# Patient Record
Sex: MALE | Race: WHITE | NOT HISPANIC OR LATINO | URBAN - METROPOLITAN AREA
[De-identification: names, ages, dates, MRNs, and addresses within clinical notes are randomized per-mention and may not be internally consistent; named-entity substitution may affect disease eponyms.]

---

## 2021-06-24 ENCOUNTER — INPATIENT (INPATIENT)
Facility: HOSPITAL | Age: 56
LOS: 0 days | Discharge: HOME | DRG: 419 | End: 2021-06-25
Attending: SURGERY | Admitting: SURGERY
Payer: COMMERCIAL

## 2021-06-24 VITALS
SYSTOLIC BLOOD PRESSURE: 172 MMHG | TEMPERATURE: 98 F | DIASTOLIC BLOOD PRESSURE: 74 MMHG | RESPIRATION RATE: 19 BRPM | HEIGHT: 71 IN | HEART RATE: 55 BPM | WEIGHT: 173.06 LBS | OXYGEN SATURATION: 100 %

## 2021-06-24 DIAGNOSIS — K81.0 ACUTE CHOLECYSTITIS: ICD-10-CM

## 2021-06-24 LAB
ALBUMIN SERPL ELPH-MCNC: 4 G/DL — SIGNIFICANT CHANGE UP (ref 3.5–5)
ALP SERPL-CCNC: 74 U/L — SIGNIFICANT CHANGE UP (ref 40–120)
ALT FLD-CCNC: 39 U/L DA — SIGNIFICANT CHANGE UP (ref 10–60)
ANION GAP SERPL CALC-SCNC: 8 MMOL/L — SIGNIFICANT CHANGE UP (ref 5–17)
APTT BLD: 29.3 SEC — SIGNIFICANT CHANGE UP (ref 27.5–35.5)
AST SERPL-CCNC: 34 U/L — SIGNIFICANT CHANGE UP (ref 10–40)
BASOPHILS # BLD AUTO: 0.05 K/UL — SIGNIFICANT CHANGE UP (ref 0–0.2)
BASOPHILS NFR BLD AUTO: 0.5 % — SIGNIFICANT CHANGE UP (ref 0–2)
BILIRUB SERPL-MCNC: 0.9 MG/DL — SIGNIFICANT CHANGE UP (ref 0.2–1.2)
BLD GP AB SCN SERPL QL: SIGNIFICANT CHANGE UP
BUN SERPL-MCNC: 13 MG/DL — SIGNIFICANT CHANGE UP (ref 7–18)
CALCIUM SERPL-MCNC: 8.7 MG/DL — SIGNIFICANT CHANGE UP (ref 8.4–10.5)
CHLORIDE SERPL-SCNC: 104 MMOL/L — SIGNIFICANT CHANGE UP (ref 96–108)
CO2 SERPL-SCNC: 30 MMOL/L — SIGNIFICANT CHANGE UP (ref 22–31)
CREAT SERPL-MCNC: 0.58 MG/DL — SIGNIFICANT CHANGE UP (ref 0.5–1.3)
EOSINOPHIL # BLD AUTO: 0.04 K/UL — SIGNIFICANT CHANGE UP (ref 0–0.5)
EOSINOPHIL NFR BLD AUTO: 0.4 % — SIGNIFICANT CHANGE UP (ref 0–6)
GLUCOSE SERPL-MCNC: 138 MG/DL — HIGH (ref 70–99)
HCT VFR BLD CALC: 38.1 % — LOW (ref 39–50)
HGB BLD-MCNC: 13.4 G/DL — SIGNIFICANT CHANGE UP (ref 13–17)
IMM GRANULOCYTES NFR BLD AUTO: 0.4 % — SIGNIFICANT CHANGE UP (ref 0–1.5)
INR BLD: 1.2 RATIO — HIGH (ref 0.88–1.16)
LACTATE SERPL-SCNC: 1.4 MMOL/L — SIGNIFICANT CHANGE UP (ref 0.7–2)
LIDOCAIN IGE QN: 64 U/L — LOW (ref 73–393)
LYMPHOCYTES # BLD AUTO: 0.75 K/UL — LOW (ref 1–3.3)
LYMPHOCYTES # BLD AUTO: 7.8 % — LOW (ref 13–44)
MCHC RBC-ENTMCNC: 29.6 PG — SIGNIFICANT CHANGE UP (ref 27–34)
MCHC RBC-ENTMCNC: 35.2 GM/DL — SIGNIFICANT CHANGE UP (ref 32–36)
MCV RBC AUTO: 84.3 FL — SIGNIFICANT CHANGE UP (ref 80–100)
MONOCYTES # BLD AUTO: 0.42 K/UL — SIGNIFICANT CHANGE UP (ref 0–0.9)
MONOCYTES NFR BLD AUTO: 4.4 % — SIGNIFICANT CHANGE UP (ref 2–14)
NEUTROPHILS # BLD AUTO: 8.34 K/UL — HIGH (ref 1.8–7.4)
NEUTROPHILS NFR BLD AUTO: 86.5 % — HIGH (ref 43–77)
NRBC # BLD: 0 /100 WBCS — SIGNIFICANT CHANGE UP (ref 0–0)
NT-PROBNP SERPL-SCNC: 248 PG/ML — HIGH (ref 0–125)
PLATELET # BLD AUTO: 173 K/UL — SIGNIFICANT CHANGE UP (ref 150–400)
POTASSIUM SERPL-MCNC: 3.2 MMOL/L — LOW (ref 3.5–5.3)
POTASSIUM SERPL-SCNC: 3.2 MMOL/L — LOW (ref 3.5–5.3)
PROT SERPL-MCNC: 6.7 G/DL — SIGNIFICANT CHANGE UP (ref 6–8.3)
PROTHROM AB SERPL-ACNC: 14.2 SEC — HIGH (ref 10.6–13.6)
RBC # BLD: 4.52 M/UL — SIGNIFICANT CHANGE UP (ref 4.2–5.8)
RBC # FLD: 12.7 % — SIGNIFICANT CHANGE UP (ref 10.3–14.5)
SARS-COV-2 RNA SPEC QL NAA+PROBE: SIGNIFICANT CHANGE UP
SODIUM SERPL-SCNC: 142 MMOL/L — SIGNIFICANT CHANGE UP (ref 135–145)
TROPONIN I SERPL-MCNC: <0.015 NG/ML — SIGNIFICANT CHANGE UP (ref 0–0.04)
WBC # BLD: 9.64 K/UL — SIGNIFICANT CHANGE UP (ref 3.8–10.5)
WBC # FLD AUTO: 9.64 K/UL — SIGNIFICANT CHANGE UP (ref 3.8–10.5)

## 2021-06-24 PROCEDURE — 88304 TISSUE EXAM BY PATHOLOGIST: CPT | Mod: 26

## 2021-06-24 PROCEDURE — 76705 ECHO EXAM OF ABDOMEN: CPT | Mod: 26

## 2021-06-24 PROCEDURE — 99285 EMERGENCY DEPT VISIT HI MDM: CPT

## 2021-06-24 PROCEDURE — 93010 ELECTROCARDIOGRAM REPORT: CPT

## 2021-06-24 PROCEDURE — 99223 1ST HOSP IP/OBS HIGH 75: CPT | Mod: 57

## 2021-06-24 PROCEDURE — 47563 LAPARO CHOLECYSTECTOMY/GRAPH: CPT | Mod: AS

## 2021-06-24 PROCEDURE — 47563 LAPARO CHOLECYSTECTOMY/GRAPH: CPT

## 2021-06-24 PROCEDURE — 71046 X-RAY EXAM CHEST 2 VIEWS: CPT | Mod: 26

## 2021-06-24 PROCEDURE — 99222 1ST HOSP IP/OBS MODERATE 55: CPT | Mod: GC

## 2021-06-24 RX ORDER — DEXTROSE MONOHYDRATE, SODIUM CHLORIDE, AND POTASSIUM CHLORIDE 50; .745; 4.5 G/1000ML; G/1000ML; G/1000ML
1000 INJECTION, SOLUTION INTRAVENOUS
Refills: 0 | Status: DISCONTINUED | OUTPATIENT
Start: 2021-06-24 | End: 2021-06-24

## 2021-06-24 RX ORDER — POTASSIUM CHLORIDE 20 MEQ
10 PACKET (EA) ORAL
Refills: 0 | Status: DISCONTINUED | OUTPATIENT
Start: 2021-06-24 | End: 2021-06-24

## 2021-06-24 RX ORDER — AZITHROMYCIN 500 MG/1
250 TABLET, FILM COATED ORAL
Refills: 0 | Status: DISCONTINUED | OUTPATIENT
Start: 2021-06-24 | End: 2021-06-24

## 2021-06-24 RX ORDER — ATORVASTATIN CALCIUM 80 MG/1
10 TABLET, FILM COATED ORAL AT BEDTIME
Refills: 0 | Status: DISCONTINUED | OUTPATIENT
Start: 2021-06-24 | End: 2021-06-25

## 2021-06-24 RX ORDER — ONDANSETRON 8 MG/1
4 TABLET, FILM COATED ORAL EVERY 6 HOURS
Refills: 0 | Status: DISCONTINUED | OUTPATIENT
Start: 2021-06-24 | End: 2021-06-24

## 2021-06-24 RX ORDER — AZITHROMYCIN 500 MG/1
250 TABLET, FILM COATED ORAL
Refills: 0 | Status: DISCONTINUED | OUTPATIENT
Start: 2021-06-24 | End: 2021-06-25

## 2021-06-24 RX ORDER — AZITHROMYCIN 500 MG/1
1 TABLET, FILM COATED ORAL
Qty: 0 | Refills: 0 | DISCHARGE

## 2021-06-24 RX ORDER — SODIUM CHLORIDE 9 MG/ML
1000 INJECTION INTRAMUSCULAR; INTRAVENOUS; SUBCUTANEOUS ONCE
Refills: 0 | Status: COMPLETED | OUTPATIENT
Start: 2021-06-24 | End: 2021-06-24

## 2021-06-24 RX ORDER — HYDROXYCHLOROQUINE SULFATE 200 MG
200 TABLET ORAL DAILY
Refills: 0 | Status: DISCONTINUED | OUTPATIENT
Start: 2021-06-24 | End: 2021-06-25

## 2021-06-24 RX ORDER — MORPHINE SULFATE 50 MG/1
4 CAPSULE, EXTENDED RELEASE ORAL ONCE
Refills: 0 | Status: DISCONTINUED | OUTPATIENT
Start: 2021-06-24 | End: 2021-06-24

## 2021-06-24 RX ORDER — ATORVASTATIN CALCIUM 80 MG/1
10 TABLET, FILM COATED ORAL AT BEDTIME
Refills: 0 | Status: DISCONTINUED | OUTPATIENT
Start: 2021-06-24 | End: 2021-06-24

## 2021-06-24 RX ORDER — HYDROMORPHONE HYDROCHLORIDE 2 MG/ML
0.5 INJECTION INTRAMUSCULAR; INTRAVENOUS; SUBCUTANEOUS EVERY 4 HOURS
Refills: 0 | Status: DISCONTINUED | OUTPATIENT
Start: 2021-06-24 | End: 2021-06-25

## 2021-06-24 RX ORDER — HYDROMORPHONE HYDROCHLORIDE 2 MG/ML
0.5 INJECTION INTRAMUSCULAR; INTRAVENOUS; SUBCUTANEOUS
Refills: 0 | Status: DISCONTINUED | OUTPATIENT
Start: 2021-06-24 | End: 2021-06-24

## 2021-06-24 RX ORDER — SODIUM CHLORIDE 9 MG/ML
1000 INJECTION, SOLUTION INTRAVENOUS
Refills: 0 | Status: DISCONTINUED | OUTPATIENT
Start: 2021-06-24 | End: 2021-06-24

## 2021-06-24 RX ORDER — ONDANSETRON 8 MG/1
4 TABLET, FILM COATED ORAL ONCE
Refills: 0 | Status: COMPLETED | OUTPATIENT
Start: 2021-06-24 | End: 2021-06-24

## 2021-06-24 RX ORDER — ACETAMINOPHEN 500 MG
650 TABLET ORAL EVERY 6 HOURS
Refills: 0 | Status: DISCONTINUED | OUTPATIENT
Start: 2021-06-24 | End: 2021-06-24

## 2021-06-24 RX ORDER — ATORVASTATIN CALCIUM 80 MG/1
1 TABLET, FILM COATED ORAL
Qty: 0 | Refills: 0 | DISCHARGE

## 2021-06-24 RX ORDER — PANTOPRAZOLE SODIUM 20 MG/1
40 TABLET, DELAYED RELEASE ORAL
Refills: 0 | Status: DISCONTINUED | OUTPATIENT
Start: 2021-06-24 | End: 2021-06-25

## 2021-06-24 RX ORDER — ONDANSETRON 8 MG/1
4 TABLET, FILM COATED ORAL EVERY 6 HOURS
Refills: 0 | Status: DISCONTINUED | OUTPATIENT
Start: 2021-06-24 | End: 2021-06-25

## 2021-06-24 RX ORDER — BUDESONIDE AND FORMOTEROL FUMARATE DIHYDRATE 160; 4.5 UG/1; UG/1
2 AEROSOL RESPIRATORY (INHALATION)
Refills: 0 | Status: DISCONTINUED | OUTPATIENT
Start: 2021-06-24 | End: 2021-06-25

## 2021-06-24 RX ORDER — PANTOPRAZOLE SODIUM 20 MG/1
40 TABLET, DELAYED RELEASE ORAL
Refills: 0 | Status: DISCONTINUED | OUTPATIENT
Start: 2021-06-24 | End: 2021-06-24

## 2021-06-24 RX ORDER — ONDANSETRON 8 MG/1
4 TABLET, FILM COATED ORAL ONCE
Refills: 0 | Status: DISCONTINUED | OUTPATIENT
Start: 2021-06-24 | End: 2021-06-24

## 2021-06-24 RX ORDER — ACETAMINOPHEN 500 MG
1000 TABLET ORAL ONCE
Refills: 0 | Status: DISCONTINUED | OUTPATIENT
Start: 2021-06-24 | End: 2021-06-24

## 2021-06-24 RX ORDER — HYDROXYCHLOROQUINE SULFATE 200 MG
200 TABLET ORAL DAILY
Refills: 0 | Status: DISCONTINUED | OUTPATIENT
Start: 2021-06-24 | End: 2021-06-24

## 2021-06-24 RX ORDER — HYDROXYCHLOROQUINE SULFATE 200 MG
2 TABLET ORAL
Qty: 0 | Refills: 0 | DISCHARGE

## 2021-06-24 RX ORDER — HEPARIN SODIUM 5000 [USP'U]/ML
5000 INJECTION INTRAVENOUS; SUBCUTANEOUS EVERY 8 HOURS
Refills: 0 | Status: DISCONTINUED | OUTPATIENT
Start: 2021-06-24 | End: 2021-06-24

## 2021-06-24 RX ORDER — PANTOPRAZOLE SODIUM 20 MG/1
1 TABLET, DELAYED RELEASE ORAL
Qty: 0 | Refills: 0 | DISCHARGE

## 2021-06-24 RX ORDER — ACETAMINOPHEN 500 MG
650 TABLET ORAL EVERY 6 HOURS
Refills: 0 | Status: DISCONTINUED | OUTPATIENT
Start: 2021-06-24 | End: 2021-06-25

## 2021-06-24 RX ORDER — BUDESONIDE AND FORMOTEROL FUMARATE DIHYDRATE 160; 4.5 UG/1; UG/1
2 AEROSOL RESPIRATORY (INHALATION)
Refills: 0 | Status: DISCONTINUED | OUTPATIENT
Start: 2021-06-24 | End: 2021-06-24

## 2021-06-24 RX ORDER — FLUTICASONE PROPIONATE AND SALMETEROL 50; 250 UG/1; UG/1
1 POWDER ORAL; RESPIRATORY (INHALATION)
Qty: 0 | Refills: 0 | DISCHARGE

## 2021-06-24 RX ADMIN — MORPHINE SULFATE 4 MILLIGRAM(S): 50 CAPSULE, EXTENDED RELEASE ORAL at 12:15

## 2021-06-24 RX ADMIN — BUDESONIDE AND FORMOTEROL FUMARATE DIHYDRATE 2 PUFF(S): 160; 4.5 AEROSOL RESPIRATORY (INHALATION) at 22:06

## 2021-06-24 RX ADMIN — ATORVASTATIN CALCIUM 10 MILLIGRAM(S): 80 TABLET, FILM COATED ORAL at 22:33

## 2021-06-24 RX ADMIN — ONDANSETRON 4 MILLIGRAM(S): 8 TABLET, FILM COATED ORAL at 12:15

## 2021-06-24 RX ADMIN — MORPHINE SULFATE 4 MILLIGRAM(S): 50 CAPSULE, EXTENDED RELEASE ORAL at 13:36

## 2021-06-24 RX ADMIN — MORPHINE SULFATE 4 MILLIGRAM(S): 50 CAPSULE, EXTENDED RELEASE ORAL at 12:30

## 2021-06-24 RX ADMIN — SODIUM CHLORIDE 1000 MILLILITER(S): 9 INJECTION INTRAMUSCULAR; INTRAVENOUS; SUBCUTANEOUS at 11:35

## 2021-06-24 RX ADMIN — Medication 200 MILLIGRAM(S): at 22:08

## 2021-06-24 RX ADMIN — MORPHINE SULFATE 4 MILLIGRAM(S): 50 CAPSULE, EXTENDED RELEASE ORAL at 14:20

## 2021-06-24 NOTE — ED ADULT TRIAGE NOTE - CHIEF COMPLAINT QUOTE
since 430 c/o right quadrant pain radiating to chest was diagnosed with gallstones was to have surgery did not do to pandemic

## 2021-06-24 NOTE — H&P ADULT - ASSESSMENT
56 y/o Male w/ Acute cholecystitis; PMHx of Anemia, Lupus, lung fibrosis, Asthma, HLD, COVID    -Admit pt to Surgical services under care of Dr Gomez   -Plan for OR/ cholecystectomy today   -Medical clearance for OR   -ECG  -CXR   -Pre op labs   -NPO   -IVF   -Pain medication PRN   -DVT ppx   -D/w Dr Gomez and agrees

## 2021-06-24 NOTE — ED PROVIDER NOTE - CLINICAL SUMMARY MEDICAL DECISION MAKING FREE TEXT BOX
pt with known hs of gall stones now with severe ruq pain   most likely acute choli.  will get us, labs and give pain meds and antiemtics  most likely admit to surg

## 2021-06-24 NOTE — H&P ADULT - NSICDXPASTMEDICALHX_GEN_ALL_CORE_FT
PAST MEDICAL HISTORY:  Asthma     Asthma     Hepatitis likely hep A    HLD (hyperlipidemia)     Lung fibrosis     Lupus

## 2021-06-24 NOTE — ED ADULT NURSE NOTE - OBJECTIVE STATEMENT
Pt. c/o abdominal pain that started around 4 am this morning with nausea and radiating to the chest. Pt. has hx of gallstones but did not receive surgery because of pandemic.

## 2021-06-24 NOTE — CONSULT NOTE ADULT - SUBJECTIVE AND OBJECTIVE BOX
KARLEY KAHN  55y  Male      Patient is a 55y old  Male who presents with a chief complaint of Acute Cholelithiasis (24 Jun 2021 14:20)    HPI : 55 y M from home with h/o Lupus( diagnosed 7 yrs ago, currently in remission, on Plaquenil), Lung fibrosis( due to lupus, on prophylactic Azithromycin), Asthma, HLD ,Anemia and COVID( 3/2020, didnt require O2) and no signficant PSHx presented to ED with sudden sharp right sided abdominal pain since 4 am. Pain was constant , radiating to left side of abd and chest associated with nausea but no fever, vomiting or diarrhea. Pt had similar pain 2 yrs when he was diagnosed with gall stones but didnt underwent cholecystectomy since then due to pandemic. Pt states his lupus is in remission, no acute attacks recently. Pt is able to climb flight of stairs without getting SOB and can walk few blocks without any SOB. 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  Pt currently denies any CP, N/V or any other complaints       PAST MEDICAL/SURGICAL HISTORY  PAST MEDICAL & SURGICAL HISTORY:  Asthma    Hepatitis  likely hep A    Lupus    Lung fibrosis    Asthma    HLD (hyperlipidemia)    No significant past surgical history        REVIEW OF SYSTEMS:  CONSTITUTIONAL: No fever, weight loss, or fatigue  EYES: No eye pain, visual disturbances, or discharge  ENMT:  No difficulty hearing, tinnitus, vertigo; No sinus or throat pain  NECK: No pain or stiffness  RESPIRATORY: No cough, wheezing, chills or hemoptysis; No shortness of breath  CARDIOVASCULAR: No chest pain, palpitations, dizziness, or leg swelling  GASTROINTESTINAL: right sided abd pain . No nausea, vomiting, or hematemesis; No diarrhea or constipation. No melena or hematochezia.  GENITOURINARY: No dysuria, frequency, hematuria, or incontinence  NEUROLOGICAL: No headaches, memory loss, loss of strength, numbness, or tremors  SKIN: No itching, burning, rashes, or lesions   LYMPH NODES: No enlarged glands  ENDOCRINE: No heat or cold intolerance; No hair loss  MUSCULOSKELETAL: No joint pain or swelling; No muscle, back, or extremity pain  PSYCHIATRIC: No depression, anxiety, mood swings, or difficulty sleeping  HEME/LYMPH: No easy bruising, or bleeding gums  ALLERY AND IMMUNOLOGIC: No hives or eczema    T(C): 37 (06-24-21 @ 15:37), Max: 37 (06-24-21 @ 15:37)  HR: 98 (06-24-21 @ 15:37) (55 - 98)  BP: 132/60 (06-24-21 @ 15:37) (132/60 - 172/74)  RR: 18 (06-24-21 @ 15:37) (18 - 19)  SpO2: 100% (06-24-21 @ 15:37) (100% - 100%)  Wt(kg): --Vital Signs Last 24 Hrs  T(C): 37 (24 Jun 2021 15:37), Max: 37 (24 Jun 2021 15:37)  T(F): 98.6 (24 Jun 2021 15:37), Max: 98.6 (24 Jun 2021 15:37)  HR: 98 (24 Jun 2021 15:37) (55 - 98)  BP: 132/60 (24 Jun 2021 15:37) (132/60 - 172/74)  BP(mean): --  RR: 18 (24 Jun 2021 15:37) (18 - 19)  SpO2: 100% (24 Jun 2021 15:37) (100% - 100%)    PHYSICAL EXAM:  GENERAL: NAD, well-groomed, well-developed  HEAD:  Atraumatic, Normocephalic  EYES: EOMI, PERRLA, conjunctiva and sclera clear  ENMT: No tonsillar erythema, exudates, or enlargement; Moist mucous membranes, Good dentition, No lesions  NECK: Supple, No JVD, Normal thyroid  NERVOUS SYSTEM:  Alert & Oriented X3, Good concentration; Motor Strength 5/5 B/L upper and lower extremities; DTRs 2+ intact and symmetric  CHEST/LUNG: b/l diffuse fine crackles . No wheezing   HEART: Regular rate and rhythm; No murmurs, rubs, or gallops  ABDOMEN: Soft, Mild RUQ tenderness,  Nondistended; Bowel sounds present  EXTREMITIES:  2+ Peripheral Pulses, No clubbing, cyanosis, or edema  LYMPH: No lymphadenopathy noted  SKIN: No rashes or lesions    Consultant(s) Notes Reviewed:  [x ] YES  [ ] NO  Care Discussed with Consultants/Other Providers [ x] YES  [ ] NO    LABS:  CBC   06-24-21 @ 11:34  Hematcorit 38.1  Hemoglobin 13.4  Mean Cell Hemoglobin 29.6  Platelet Count-Automated 173  RBC Count 4.52  Red Cell Distrib Width 12.7  Wbc Count 9.64      BMP  06-24-21 @ 11:34  Anion Gap. Serum 8  Blood Urea Nitrogen,Serm 13  Calcium, Total Serum 8.7  Carbon Dioxide, Serum 30  Chloride, Serum 104  Creatinine, Serum 0.58  eGFR in  131  eGFR in Non Afican American 113  Gloucose, serum 138  Potassium, Serum 3.2  Sodium, Serum 142                  CMP  06-24-21 @ 11:34  Barbara Aminotransferase(ALT/SGPT)39  Albumin, Serum 4.0  Alkaline Phosphatase, Serum 74  Anion Gap, Serum 8  Aspartate Aminotransferase (AST/SGOT)34  Bilirubin Total, Serum 0.9  Blood Urea Nitrogen, Serum 13  Calcium,Total Serum 8.7  Carbon Dioxide, Serum 30  Chloride, Serum 104  Creatinine, Serum 0.58  eGFR if  131  eGFR if Non African American 113  Glucose, Serum 138  Potassium, Serum 3.2  Protein Total, Serum 6.7  Sodium, Serum 142                          PT/INR  PT/INR  06-24-21 @ 15:18  INR 1.20  Prothrombin Time Comment --  Prothrobin Time, [id number]      Amylase/Lipase  06-24-21 @ 11:34  Amylase, Serum Total --  Lipase, Serum 64            RADIOLOGY & ADDITIONAL TESTS:   ECG : NSR with no ST -T changes     Imaging Personally Reviewed:  [ ] YES  [ ] NO

## 2021-06-24 NOTE — CONSULT NOTE ADULT - ATTENDING COMMENTS
I have discussed this patient's presentation and reviewed the findings with Dr. Augustin.   I agree that he is optimized for surgery.

## 2021-06-24 NOTE — H&P ADULT - HISTORY OF PRESENT ILLNESS
56 y/o Male w/ PMHx of Anemia, Lupus, lung fibrosis, Asthma, HLD, COVID, no sig PSHx presented to ED w/ c/o abdominal pain, pain present since 4AM, sharp and radiating to left upper quadrant and chest, pain a/w nausea, no vomiting. Pt admits to hx of similar symptoms, diagnosed w/ GS, never had intervention due to COVID infection and pandemic. Denies fever, chills, SOB or CP.  Hepatic US done and shows:  < from: US Abdomen Limited (06.24.21 @ 12:43) >    FINDINGS:    Liver: The liver is normal in size. No focal hepatic masses are identified.  Bile ducts: Normal caliber. Common bile duct measures 6 mm.  Gallbladder: Multiple gallstones. No gallbladder wall thickening. No pericholecystic fluid.  Pancreas: Poorly visualized.  Right kidney: 12.0 cm. No hydronephrosis. No renal calculi. No space-occupying lesions.  Ascites: None.  IVC and aorta: Visualized portions are within normal limits.    IMPRESSION: Multiple gallstones. No gallbladder wall thickening. No pericholecystic fluid.    < end of copied text >

## 2021-06-25 VITALS
OXYGEN SATURATION: 98 % | RESPIRATION RATE: 18 BRPM | DIASTOLIC BLOOD PRESSURE: 68 MMHG | TEMPERATURE: 98 F | SYSTOLIC BLOOD PRESSURE: 126 MMHG | HEART RATE: 63 BPM

## 2021-06-25 LAB
ALBUMIN SERPL ELPH-MCNC: 3.5 G/DL — SIGNIFICANT CHANGE UP (ref 3.5–5)
ALP SERPL-CCNC: 185 U/L — HIGH (ref 40–120)
ALT FLD-CCNC: 296 U/L DA — HIGH (ref 10–60)
ANION GAP SERPL CALC-SCNC: 7 MMOL/L — SIGNIFICANT CHANGE UP (ref 5–17)
AST SERPL-CCNC: 203 U/L — HIGH (ref 10–40)
BILIRUB SERPL-MCNC: 1.2 MG/DL — SIGNIFICANT CHANGE UP (ref 0.2–1.2)
BUN SERPL-MCNC: 9 MG/DL — SIGNIFICANT CHANGE UP (ref 7–18)
CALCIUM SERPL-MCNC: 8.6 MG/DL — SIGNIFICANT CHANGE UP (ref 8.4–10.5)
CHLORIDE SERPL-SCNC: 106 MMOL/L — SIGNIFICANT CHANGE UP (ref 96–108)
CO2 SERPL-SCNC: 28 MMOL/L — SIGNIFICANT CHANGE UP (ref 22–31)
COVID-19 SPIKE DOMAIN AB INTERP: POSITIVE
COVID-19 SPIKE DOMAIN ANTIBODY RESULT: 233 U/ML — HIGH
CREAT SERPL-MCNC: 0.44 MG/DL — LOW (ref 0.5–1.3)
GLUCOSE SERPL-MCNC: 126 MG/DL — HIGH (ref 70–99)
HCT VFR BLD CALC: 37 % — LOW (ref 39–50)
HCV AB S/CO SERPL IA: 0.1 S/CO — SIGNIFICANT CHANGE UP (ref 0–0.99)
HCV AB SERPL-IMP: SIGNIFICANT CHANGE UP
HGB BLD-MCNC: 13.3 G/DL — SIGNIFICANT CHANGE UP (ref 13–17)
MCHC RBC-ENTMCNC: 30.2 PG — SIGNIFICANT CHANGE UP (ref 27–34)
MCHC RBC-ENTMCNC: 35.9 GM/DL — SIGNIFICANT CHANGE UP (ref 32–36)
MCV RBC AUTO: 83.9 FL — SIGNIFICANT CHANGE UP (ref 80–100)
NRBC # BLD: 0 /100 WBCS — SIGNIFICANT CHANGE UP (ref 0–0)
PLATELET # BLD AUTO: 189 K/UL — SIGNIFICANT CHANGE UP (ref 150–400)
POTASSIUM SERPL-MCNC: 3.4 MMOL/L — LOW (ref 3.5–5.3)
POTASSIUM SERPL-SCNC: 3.4 MMOL/L — LOW (ref 3.5–5.3)
PROT SERPL-MCNC: 6.1 G/DL — SIGNIFICANT CHANGE UP (ref 6–8.3)
RBC # BLD: 4.41 M/UL — SIGNIFICANT CHANGE UP (ref 4.2–5.8)
RBC # FLD: 12.5 % — SIGNIFICANT CHANGE UP (ref 10.3–14.5)
SARS-COV-2 IGG+IGM SERPL QL IA: 233 U/ML — HIGH
SARS-COV-2 IGG+IGM SERPL QL IA: POSITIVE
SODIUM SERPL-SCNC: 141 MMOL/L — SIGNIFICANT CHANGE UP (ref 135–145)
WBC # BLD: 7.9 K/UL — SIGNIFICANT CHANGE UP (ref 3.8–10.5)
WBC # FLD AUTO: 7.9 K/UL — SIGNIFICANT CHANGE UP (ref 3.8–10.5)

## 2021-06-25 PROCEDURE — 99232 SBSQ HOSP IP/OBS MODERATE 35: CPT

## 2021-06-25 RX ORDER — INDOMETHACIN 50 MG
100 CAPSULE ORAL ONCE
Refills: 0 | Status: COMPLETED | OUTPATIENT
Start: 2021-06-25 | End: 2021-06-25

## 2021-06-25 RX ORDER — CIPROFLOXACIN LACTATE 400MG/40ML
400 VIAL (ML) INTRAVENOUS ONCE
Refills: 0 | Status: COMPLETED | OUTPATIENT
Start: 2021-06-25 | End: 2021-06-25

## 2021-06-25 RX ORDER — SODIUM CHLORIDE 9 MG/ML
1000 INJECTION, SOLUTION INTRAVENOUS
Refills: 0 | Status: DISCONTINUED | OUTPATIENT
Start: 2021-06-25 | End: 2021-06-25

## 2021-06-25 RX ORDER — POTASSIUM CHLORIDE 20 MEQ
10 PACKET (EA) ORAL ONCE
Refills: 0 | Status: COMPLETED | OUTPATIENT
Start: 2021-06-25 | End: 2021-06-25

## 2021-06-25 RX ORDER — HEPARIN SODIUM 5000 [USP'U]/ML
5000 INJECTION INTRAVENOUS; SUBCUTANEOUS EVERY 8 HOURS
Refills: 0 | Status: DISCONTINUED | OUTPATIENT
Start: 2021-06-25 | End: 2021-06-25

## 2021-06-25 RX ORDER — ACETAMINOPHEN 500 MG
2 TABLET ORAL
Qty: 0 | Refills: 0 | DISCHARGE
Start: 2021-06-25

## 2021-06-25 RX ADMIN — Medication 100 MILLIGRAM(S): at 10:40

## 2021-06-25 RX ADMIN — HEPARIN SODIUM 5000 UNIT(S): 5000 INJECTION INTRAVENOUS; SUBCUTANEOUS at 15:31

## 2021-06-25 RX ADMIN — BUDESONIDE AND FORMOTEROL FUMARATE DIHYDRATE 2 PUFF(S): 160; 4.5 AEROSOL RESPIRATORY (INHALATION) at 15:33

## 2021-06-25 RX ADMIN — Medication 200 MILLIGRAM(S): at 16:28

## 2021-06-25 RX ADMIN — PANTOPRAZOLE SODIUM 40 MILLIGRAM(S): 20 TABLET, DELAYED RELEASE ORAL at 06:11

## 2021-06-25 RX ADMIN — AZITHROMYCIN 250 MILLIGRAM(S): 500 TABLET, FILM COATED ORAL at 06:11

## 2021-06-25 RX ADMIN — Medication 100 MILLIEQUIVALENT(S): at 14:57

## 2021-06-25 RX ADMIN — Medication 200 MILLIGRAM(S): at 15:31

## 2021-06-25 NOTE — PROGRESS NOTE ADULT - ASSESSMENT
55 y M from home with h/o Lupus( diagnosed 7 yrs ago, currently in remission, on Plaquenil), Lung fibrosis( due to lupus, on prophylactic Azithromycin), Asthma, HLD ,Anemia and COVID( 3/2020, didnt require O2) and no signficant PSHx presented to ED with sudden sharp right sided abdominal pain . Found to have cholelithiasis without acute cholecystitis, Admitted to Surgery for Lap Cholecystectomy, underwent procedure on 6/24 and ERCP with stone removal. Planned for discharge home today. Continue home Prednisone and Plaquenil for h/o Lupus and Azithro for h/o Lung fibrosis. Not in acute asthma exacerbation c/w bronchodilators. Planned for discharge home today. Medicine will sign off, please call if any questions.

## 2021-06-25 NOTE — CHART NOTE - NSCHARTNOTEFT_GEN_A_CORE
ERCP  Report  Indication:  Positive IOC   Instrument:  #  Anesthesia: General in ENDO   Consent:  Informed consent was obtained from the patient after providing any opportunity for questions  Procedure: See below for full summary    Preparation: NPO, Cipro, Indomethacin   Findings:   ERCP was performed:  1)	The duodenoscope was gently passed down the esophagus, into the stomach and into the duodenum.   2)	The ampulla was identified   3)	Under fluoroscopy the CBD was successfully cannulated.   4)	Cholangiogram showed a non-dilated CBD (7mm) , with one filling defects   5)	A sphincterotomy was performed  6)	Multiple balloon sweeps performed with one black pigmented stone removed.   7)	Occlusion cholangiogram showed no filling defects   ____________________________________________    EBL:0  Impression: 1-ERC s.p sphincterotomy and stone extraction     Plan: 1- NPO until tonight then clear liquids 2- IVF Bolus X 2 followed by 200 cc/ hour X 6 hours 3-Avoid NSAIDs X 10 days 4- C      Attending:         Nasir Saucedo M.D.

## 2021-06-25 NOTE — DISCHARGE NOTE PROVIDER - PROVIDER TOKENS
PROVIDER:[TOKEN:[[id number]],FOLLOWUP:[2 weeks]],PROVIDER:[TOKEN:[[id number]],FOLLOWUP:[Routine]]

## 2021-06-25 NOTE — DISCHARGE NOTE PROVIDER - HOSPITAL COURSE
56 y/o Male w/ PMHx of Anemia, Lupus, lung fibrosis, Asthma, HLD, COVID, no sig PSHx presented to ED w/ c/o abdominal pain, pain present since 4AM, sharp and radiating to left upper quadrant and chest, pain a/w nausea, no vomiting. Pt admits to hx of similar symptoms, diagnosed w/ GS, never had intervention due to COVID infection and pandemic. Denies fever, chills, SOB or CP.  Hepatic US done and showed multiple gallstones. No gallbladder wall thickening. No pericholecystic fluid. Pt was taken to OR and underwent laparoscopic cholecystectomy on 6/24/21 and intraoperatively patient was found to have a filling defect on CBD. On the following day, pt underwent ERCP sphincterotomy and stone extraction in which is well tolerated. Pt remained hemodynamically stable deemed safe for discharge home with follow up instructions.

## 2021-06-25 NOTE — CONSULT NOTE ADULT - SUBJECTIVE AND OBJECTIVE BOX
Patient is a 55y old  Male who presents with a chief complaint of Acute Cholelithiasis (25 Jun 2021 08:52)     .     HPI:    HPI:  56 y/o Male w/ PMHx of Anemia, Lupus, lung fibrosis, Asthma, HLD, COVID, no sig PSHx presented to ED w/ c/o abdominal pain, pain present since 4AM, sharp and radiating to left upper quadrant and chest, pain a/w nausea, no vomiting. Pt admits to hx of similar symptoms, diagnosed w/ GS, never had intervention due to COVID infection and pandemic. Denies fever, chills, SOB or CP.    FINDINGS:    Liver: The liver is normal in size. No focal hepatic masses are identified.  Bile ducts: Normal caliber. Common bile duct measures 6 mm.  Gallbladder: Multiple gallstones. No gallbladder wall thickening. No pericholecystic fluid.  Pancreas: Poorly visualized.  Right kidney: 12.0 cm. No hydronephrosis. No renal calculi. No space-occupying lesions.  Ascites: None.  IVC and aorta: Visualized portions are within normal limits.    IMPRESSION: Multiple gallstones. No gallbladder wall thickening. No pericholecystic fluid.    < end of copied text >   (24 Jun 2021 14:20)          REVIEW OF SYSTEMS  Constitutional:   No fever, no fatigue, no pallor, no night sweats, no weight loss.  HEENT:   No eye pain, no vision changes, no icterus, no mouth ulcers.  Respiratory:   No shortness of breath, no cough, no respiratory distress.   Cardiovascular:   No chest pain, no palpitations.   Gastrointestinal: No abdominal pain, no nausea, no vomiting , no diahrrea, no constipation, no hematochezia,no melena.  Skin:   No rashes, no jaundice, no eczema.   Musculoskeletal:   No joint pain, no swelling, no myalgia.   Neurologic:   No headache, no seizure, no weakness.   Genitourinary:   No dysuria, no decreased urine output.  Psychiatric:  No depression, no anxiety,   Endocrine:   No thyroid disease, no diabetes.  Heme/Lymphatic:   No anemia, no blood transfusions, no lymph node enlargement, no bleeding, no bruising.  ___________________________________________________________________________________________  Allergies    aspirin (Short breath)  penicillin (Short breath)    Intolerances      MEDICATIONS  (STANDING):  atorvastatin 10 milliGRAM(s) Oral at bedtime  azithromycin   Tablet 250 milliGRAM(s) Oral <User Schedule>  budesonide  80 MICROgram(s)/formoterol 4.5 MICROgram(s) Inhaler 2 Puff(s) Inhalation two times a day  hydroxychloroquine 200 milliGRAM(s) Oral daily  indomethacin Suppository 100 milliGRAM(s) Rectal once  indomethacin Suppository 100 milliGRAM(s) Rectal once  pantoprazole    Tablet 40 milliGRAM(s) Oral before breakfast  potassium chloride  10 mEq/100 mL IVPB 10 milliEquivalent(s) IV Intermittent once    MEDICATIONS  (PRN):  acetaminophen   Tablet .. 650 milliGRAM(s) Oral every 6 hours PRN Temp greater or equal to 38C (100.4F), Mild Pain (1 - 3)  HYDROmorphone  Injectable 0.5 milliGRAM(s) IV Push every 4 hours PRN Severe Pain (7 - 10)  ondansetron Injectable 4 milliGRAM(s) IV Push every 6 hours PRN Nausea      PAST MEDICAL & SURGICAL HISTORY:  Asthma    Hepatitis  likely hep A    Lupus    Lung fibrosis    Asthma    HLD (hyperlipidemia)    No significant past surgical history      FAMILY HISTORY:    Social History: No hsitory of : Tobacco use, IVDA, EToH  ______________________________________________________________________________________    PHYSICAL EXAM    Daily Height in cm: 180.34 (24 Jun 2021 10:37)    Daily   BMI: 24.1 (06-24 @ 10:37)  Change in Weight:  Vital Signs Last 24 Hrs  T(C): 36.3 (25 Jun 2021 05:37), Max: 37 (24 Jun 2021 15:37)  T(F): 97.4 (25 Jun 2021 05:37), Max: 98.6 (24 Jun 2021 15:37)  HR: 73 (25 Jun 2021 05:37) (55 - 99)  BP: 133/70 (25 Jun 2021 05:37) (122/78 - 172/74)  BP(mean): 88 (24 Jun 2021 20:12) (88 - 93)  RR: 16 (25 Jun 2021 05:37) (14 - 19)  SpO2: 96% (25 Jun 2021 05:37) (94% - 100%)    General:  Well developed, well nourished, alert and active, no pallor, NAD.  HEENT:    Normal appearance of conjunctiva, ears, nose, lips, oropharynx, and oral mucosa, anicteric.  Neck:  No masses, no asymmetry.  Lymph Nodes:  No lymphadenopathy.   Cardiovascular:  RRR normal S1/S2, no murmur.  Respiratory:  CTA B/L, normal respiratory effort.   Abdominal:   soft, no masses or tenderness, normoactive BS, NT/ND, no HSM.  Extremities:   No clubbing or cyanosis, normal capillary refill, no edema.   Skin:   No rash, jaundice, lesions, eczema.   Musculoskeletal:  No joint swelling, erythema or tenderness.   Neuro: No focal deficits.   Other:   _______________________________________________________________________________________________  Lab Results:                          13.3   7.90  )-----------( 189      ( 25 Jun 2021 06:22 )             37.0     06-25    141  |  106  |  9   ----------------------------<  126<H>  3.4<L>   |  28  |  0.44<L>    Ca    8.6      25 Jun 2021 06:22    TPro  6.1  /  Alb  3.5  /  TBili  1.2  /  DBili  x   /  AST  203<H>  /  ALT  296<H>  /  AlkPhos  185<H>  06-25    LIVER FUNCTIONS - ( 25 Jun 2021 06:22 )  Alb: 3.5 g/dL / Pro: 6.1 g/dL / ALK PHOS: 185 U/L / ALT: 296 U/L DA / AST: 203 U/L / GGT: x           PT/INR - ( 24 Jun 2021 15:18 )   PT: 14.2 sec;   INR: 1.20 ratio         PTT - ( 24 Jun 2021 15:18 )  PTT:29.3 sec    CARDIAC MARKERS ( 24 Jun 2021 11:34 )  <0.015 ng/mL / x     / x     / x     / x          Stool Results:          RADIOLOGY RESULTS:    SURGICAL PATHOLOGY:

## 2021-06-25 NOTE — DISCHARGE NOTE PROVIDER - NSDCFUADDINST_GEN_ALL_CORE_FT
- can remove dressing in 48 hours  - do not take steri-strips off, allow them to fall off on their own  - do not lift anything heavier than 10 lbs for 2-4 weeks, avoid strenuous activity for 2 weeks  - take over the counter medications for pain, tylenol or ibuprofen with food  - shower as necessary, do not soak or bathe until cleared by surgeon  - follow up with surgeon, Dr Gomez in 1-2 weeks, call to schedule an appointment

## 2021-06-25 NOTE — PROGRESS NOTE ADULT - ASSESSMENT
55 yoM s/p lap odell 6/24 +IOC    - ERCP today  - clears after procedure  - replete K  - dvt/gi ppx, pain control   55 yoM s/p lap odell 6/24 +IOC    - ERCP today  - clears after procedure  - replete K  - azithromycin for resp ppx given h/o lung fibrosis 2/2 lupus  - dvt/gi ppx, pain control

## 2021-06-25 NOTE — PROGRESS NOTE ADULT - SUBJECTIVE AND OBJECTIVE BOX
INTERVAL HPI/OVERNIGHT EVENTS:    No acute events overnight.   Pt resting comfortably. No acute complaints.   flatus/BM.   Denies N/V    MEDICATIONS  (STANDING):  atorvastatin 10 milliGRAM(s) Oral at bedtime  azithromycin   Tablet 250 milliGRAM(s) Oral <User Schedule>  budesonide  80 MICROgram(s)/formoterol 4.5 MICROgram(s) Inhaler 2 Puff(s) Inhalation two times a day  hydroxychloroquine 200 milliGRAM(s) Oral daily  pantoprazole    Tablet 40 milliGRAM(s) Oral before breakfast  potassium chloride  10 mEq/100 mL IVPB 10 milliEquivalent(s) IV Intermittent once    MEDICATIONS  (PRN):  acetaminophen   Tablet .. 650 milliGRAM(s) Oral every 6 hours PRN Temp greater or equal to 38C (100.4F), Mild Pain (1 - 3)  HYDROmorphone  Injectable 0.5 milliGRAM(s) IV Push every 4 hours PRN Severe Pain (7 - 10)  ondansetron Injectable 4 milliGRAM(s) IV Push every 6 hours PRN Nausea      Vital Signs Last 24 Hrs  T(C): 36.3 (25 Jun 2021 05:37), Max: 37 (24 Jun 2021 15:37)  T(F): 97.4 (25 Jun 2021 05:37), Max: 98.6 (24 Jun 2021 15:37)  HR: 73 (25 Jun 2021 05:37) (55 - 99)  BP: 133/70 (25 Jun 2021 05:37) (122/78 - 172/74)  BP(mean): 88 (24 Jun 2021 20:12) (88 - 93)  RR: 16 (25 Jun 2021 05:37) (14 - 19)  SpO2: 96% (25 Jun 2021 05:37) (94% - 100%)      PHYSICAL EXAM  General: Alert and oriented, not in acute distress  Resp: Breathing unlabored  Abdomen: Soft, nondistended, nontender; dressing cdi  : No rene catheter, no dysuria or hematuria  Extremities: No pedal edema    I&O's Detail    24 Jun 2021 07:01  -  25 Jun 2021 07:00  --------------------------------------------------------  IN:    IV PiggyBack: 75 mL  Total IN: 75 mL    OUT:    Voided (mL): 0 mL  Total OUT: 0 mL    Total NET: 75 mL          LABS:                        13.3   7.90  )-----------( 189      ( 25 Jun 2021 06:22 )             37.0             06-25    141  |  106  |  9   ----------------------------<  126<H>  3.4<L>   |  28  |  0.44<L>    Ca    8.6      25 Jun 2021 06:22    TPro  6.1  /  Alb  3.5  /  TBili  1.2  /  DBili  x   /  AST  203<H>  /  ALT  296<H>  /  AlkPhos  185<H>  06-25      
Patient seen and examined this morning. States he feels well. Tolerating diet and abdominal pain has resolved.     Plan of care discussed with medical team.    acetaminophen   Tablet .. 650 milliGRAM(s) Oral every 6 hours PRN  atorvastatin 10 milliGRAM(s) Oral at bedtime  azithromycin   Tablet 250 milliGRAM(s) Oral <User Schedule>  budesonide  80 MICROgram(s)/formoterol 4.5 MICROgram(s) Inhaler 2 Puff(s) Inhalation two times a day  heparin   Injectable 5000 Unit(s) SubCutaneous every 8 hours  HYDROmorphone  Injectable 0.5 milliGRAM(s) IV Push every 4 hours PRN  hydroxychloroquine 200 milliGRAM(s) Oral daily  lactated ringers. 1000 milliLiter(s) IV Continuous <Continuous>  lactated ringers. 1000 milliLiter(s) IV Continuous <Continuous>  ondansetron Injectable 4 milliGRAM(s) IV Push every 6 hours PRN  pantoprazole    Tablet 40 milliGRAM(s) Oral before breakfast      VITALS:  Vital Signs Last 24 Hrs  T(C): 36.6 (25 Jun 2021 14:48), Max: 36.7 (24 Jun 2021 19:51)  T(F): 97.9 (25 Jun 2021 14:48), Max: 98.1 (24 Jun 2021 19:51)  HR: 63 (25 Jun 2021 14:48) (63 - 99)  BP: 126/68 (25 Jun 2021 14:48) (122/78 - 146/75)  BP(mean): 81 (25 Jun 2021 14:48) (81 - 93)  RR: 18 (25 Jun 2021 14:48) (15 - 18)  SpO2: 98% (25 Jun 2021 14:48) (94% - 99%)    EXAM:  GEN: alert, in no acute distress  CVS: rrr, normal s1/s2  RESP: clear bilaterally, no w/r/r  ABD: soft, nontender, nondistended, normoactive bowel sounds, dressing over lap odell sites  EXT: no LE edema  NEURO: aaox3, no focal deficits    LABS:                      13.3   7.90  )-----------( 189      ( 25 Jun 2021 06:22 )             37.0     06-25    141  |  106  |  9   ----------------------------<  126<H>  3.4<L>   |  28  |  0.44<L>    Ca    8.6      25 Jun 2021 06:22    TPro  6.1  /  Alb  3.5  /  TBili  1.2  /  DBili  x   /  AST  203<H>  /  ALT  296<H>  /  AlkPhos  185<H>  06-25      IMAGING: reviewed

## 2021-06-25 NOTE — DISCHARGE NOTE PROVIDER - NSDCMRMEDTOKEN_GEN_ALL_CORE_FT
acetaminophen 325 mg oral tablet: 2 tab(s) orally every 6 hours, As needed, Temp greater or equal to 38C (100.4F), Mild Pain (1 - 3)  Advair Diskus 100 mcg-50 mcg inhalation powder: 1 puff(s) inhaled 2 times a day  atorvastatin 10 mg oral tablet: 1 tab(s) orally once a day  azithromycin 250 mg oral tablet: 1 tab(s) orally Monday, Wednesday, and Friday  hydroxychloroquine 200 mg oral tablet: 2 tab(s) orally once a day  pantoprazole 20 mg oral delayed release tablet: 1 tab(s) orally once a day

## 2021-06-25 NOTE — DISCHARGE NOTE NURSING/CASE MANAGEMENT/SOCIAL WORK - PATIENT PORTAL LINK FT
You can access the FollowMyHealth Patient Portal offered by [facility] by registering at the following website: [email or web address]. By joining [facility]’s FollowMyHealth portal, you will also be able to view your health information using other applications (apps) compatible with our system.

## 2021-06-25 NOTE — CONSULT NOTE ADULT - ASSESSMENT
55 year old male with CBD stone found during Lap odell       CBD stone   ERCP today  NPO  COVID swab       GB stones   S.p lap odell     Advanced care planning was discussed with patient and family.  Advanced care planning forms were reviewed and discussed.  Risks, benefits and alternatives of gastroenterologic procedures were discussed in detail and all questions were answered.  \    I was physically present for the key portions of the evaluation and management (E/M) service provided.  The patient was personally seen and examined at bedside.  I have edited the note as appropriate.   Thank you for your consultation and allowing  me to participate in the care of your patients. If you have further questions please contact me at [phone].     Nasir Fatima M.D.       _________________________________________________________________________________________________  [facility]  [address] [place] [address]  Office: [phone]    Raymond Lozada PA-C    ___________________________________________________________________________________________________    
55 y M from home with h/o Lupus( diagnosed 7 yrs ago, currently in remission, on Plaquenil), Lung fibrosis( due to lupus, on prophylactic Azithromycin), Asthma, HLD ,Anemia and COVID( 3/2020, didnt require O2) and no signficant PSHx presented to ED with sudden sharp right sided abdominal pain . Found to have cholelithiasis without acute cholecystitis, Admitted to Surgical floor for Lap Cholecystectomy. Medicine team consulted for Pre- op optimization     A/P :     1. Cholelithiasis :   - Plan for Lap cholecystectomy today   - NPO, IVF , pain control   - Surgery following     2. Pre-op optimization :   - Trop -ve   - METS > 4 , RCRI Score 0 , Jesus score 0.1 % risk of MI or cardiac arrest intraoperatively . But given Lupus and lung fibrosis , pt is at low-moderate risk for cardiac complications for Low  risk surgical procedure.    3. Hypokalemia :   - K 3.2 , replace prn     4. Lupus :   - c/w Plaquenil home dose   - monitor Hb and Plt count     5. Lung fibrosis :   - c/w Azithromycin prophylactic  home dose   - monitor Resp status     6. Asthma :   - c/w bronchodilators   - not in exacerbation     7. HLD :   - c/w atorvastatin     8. Anemia :   - Hb 13.4

## 2021-06-25 NOTE — DISCHARGE NOTE PROVIDER - CARE PROVIDER_API CALL
Baljeet Gomez (MD)  Surgery  [address] [place] [address]  Phone: [phone]  Fax: [phone]  Follow Up Time: 2 weeks    Nasir Saucedo  Gastroenterology  [address] [place] [id number]  Phone: [phone]  Fax: [phone]  Follow Up Time: Routine

## 2021-06-26 ENCOUNTER — EMERGENCY (EMERGENCY)
Facility: HOSPITAL | Age: 56
LOS: 1 days | Discharge: HOME | End: 2021-06-26
Attending: STUDENT IN AN ORGANIZED HEALTH CARE EDUCATION/TRAINING PROGRAM
Payer: COMMERCIAL

## 2021-06-26 VITALS
DIASTOLIC BLOOD PRESSURE: 76 MMHG | WEIGHT: 177.03 LBS | OXYGEN SATURATION: 96 % | HEIGHT: 71 IN | HEART RATE: 69 BPM | TEMPERATURE: 98 F | SYSTOLIC BLOOD PRESSURE: 146 MMHG | RESPIRATION RATE: 18 BRPM

## 2021-06-26 PROBLEM — J45.909 UNSPECIFIED ASTHMA, UNCOMPLICATED: Chronic | Status: ACTIVE | Noted: 2021-06-24

## 2021-06-26 PROBLEM — J84.10 PULMONARY FIBROSIS, UNSPECIFIED: Chronic | Status: ACTIVE | Noted: 2021-06-24

## 2021-06-26 PROBLEM — M32.9 SYSTEMIC LUPUS ERYTHEMATOSUS, UNSPECIFIED: Chronic | Status: ACTIVE | Noted: 2021-06-24

## 2021-06-26 PROBLEM — E78.5 HYPERLIPIDEMIA, UNSPECIFIED: Chronic | Status: ACTIVE | Noted: 2021-06-24

## 2021-06-26 LAB
ALBUMIN SERPL ELPH-MCNC: 3.5 G/DL — SIGNIFICANT CHANGE UP (ref 3.5–5)
ALP SERPL-CCNC: 148 U/L — HIGH (ref 40–120)
ALT FLD-CCNC: 221 U/L DA — HIGH (ref 10–60)
ANION GAP SERPL CALC-SCNC: 6 MMOL/L — SIGNIFICANT CHANGE UP (ref 5–17)
AST SERPL-CCNC: 115 U/L — HIGH (ref 10–40)
BASOPHILS # BLD AUTO: 0.02 K/UL — SIGNIFICANT CHANGE UP (ref 0–0.2)
BASOPHILS NFR BLD AUTO: 0.2 % — SIGNIFICANT CHANGE UP (ref 0–2)
BILIRUB SERPL-MCNC: 0.8 MG/DL — SIGNIFICANT CHANGE UP (ref 0.2–1.2)
BUN SERPL-MCNC: 17 MG/DL — SIGNIFICANT CHANGE UP (ref 7–18)
CALCIUM SERPL-MCNC: 8.5 MG/DL — SIGNIFICANT CHANGE UP (ref 8.4–10.5)
CHLORIDE SERPL-SCNC: 106 MMOL/L — SIGNIFICANT CHANGE UP (ref 96–108)
CO2 SERPL-SCNC: 28 MMOL/L — SIGNIFICANT CHANGE UP (ref 22–31)
CREAT SERPL-MCNC: 0.68 MG/DL — SIGNIFICANT CHANGE UP (ref 0.5–1.3)
EOSINOPHIL # BLD AUTO: 0.2 K/UL — SIGNIFICANT CHANGE UP (ref 0–0.5)
EOSINOPHIL NFR BLD AUTO: 1.8 % — SIGNIFICANT CHANGE UP (ref 0–6)
GLUCOSE SERPL-MCNC: 149 MG/DL — HIGH (ref 70–99)
HCT VFR BLD CALC: 34.8 % — LOW (ref 39–50)
HGB BLD-MCNC: 12.1 G/DL — LOW (ref 13–17)
IMM GRANULOCYTES NFR BLD AUTO: 0.6 % — SIGNIFICANT CHANGE UP (ref 0–1.5)
LIDOCAIN IGE QN: 76 U/L — SIGNIFICANT CHANGE UP (ref 73–393)
LYMPHOCYTES # BLD AUTO: 1.25 K/UL — SIGNIFICANT CHANGE UP (ref 1–3.3)
LYMPHOCYTES # BLD AUTO: 11.4 % — LOW (ref 13–44)
MCHC RBC-ENTMCNC: 29.2 PG — SIGNIFICANT CHANGE UP (ref 27–34)
MCHC RBC-ENTMCNC: 34.8 GM/DL — SIGNIFICANT CHANGE UP (ref 32–36)
MCV RBC AUTO: 84.1 FL — SIGNIFICANT CHANGE UP (ref 80–100)
MONOCYTES # BLD AUTO: 0.73 K/UL — SIGNIFICANT CHANGE UP (ref 0–0.9)
MONOCYTES NFR BLD AUTO: 6.7 % — SIGNIFICANT CHANGE UP (ref 2–14)
NEUTROPHILS # BLD AUTO: 8.68 K/UL — HIGH (ref 1.8–7.4)
NEUTROPHILS NFR BLD AUTO: 79.3 % — HIGH (ref 43–77)
NRBC # BLD: 0 /100 WBCS — SIGNIFICANT CHANGE UP (ref 0–0)
PLATELET # BLD AUTO: 203 K/UL — SIGNIFICANT CHANGE UP (ref 150–400)
POTASSIUM SERPL-MCNC: 4 MMOL/L — SIGNIFICANT CHANGE UP (ref 3.5–5.3)
POTASSIUM SERPL-SCNC: 4 MMOL/L — SIGNIFICANT CHANGE UP (ref 3.5–5.3)
PROT SERPL-MCNC: 6.1 G/DL — SIGNIFICANT CHANGE UP (ref 6–8.3)
RBC # BLD: 4.14 M/UL — LOW (ref 4.2–5.8)
RBC # FLD: 12.7 % — SIGNIFICANT CHANGE UP (ref 10.3–14.5)
SODIUM SERPL-SCNC: 140 MMOL/L — SIGNIFICANT CHANGE UP (ref 135–145)
WBC # BLD: 10.95 K/UL — HIGH (ref 3.8–10.5)
WBC # FLD AUTO: 10.95 K/UL — HIGH (ref 3.8–10.5)

## 2021-06-26 PROCEDURE — 80053 COMPREHEN METABOLIC PANEL: CPT

## 2021-06-26 PROCEDURE — 83690 ASSAY OF LIPASE: CPT

## 2021-06-26 PROCEDURE — 96374 THER/PROPH/DIAG INJ IV PUSH: CPT

## 2021-06-26 PROCEDURE — 36415 COLL VENOUS BLD VENIPUNCTURE: CPT

## 2021-06-26 PROCEDURE — 85025 COMPLETE CBC W/AUTO DIFF WBC: CPT

## 2021-06-26 PROCEDURE — 99284 EMERGENCY DEPT VISIT MOD MDM: CPT

## 2021-06-26 PROCEDURE — 99284 EMERGENCY DEPT VISIT MOD MDM: CPT | Mod: 25

## 2021-06-26 RX ORDER — OXYCODONE AND ACETAMINOPHEN 5; 325 MG/1; MG/1
1 TABLET ORAL ONCE
Refills: 0 | Status: DISCONTINUED | OUTPATIENT
Start: 2021-06-26 | End: 2021-06-26

## 2021-06-26 RX ORDER — MORPHINE SULFATE 50 MG/1
4 CAPSULE, EXTENDED RELEASE ORAL ONCE
Refills: 0 | Status: DISCONTINUED | OUTPATIENT
Start: 2021-06-26 | End: 2021-06-26

## 2021-06-26 RX ADMIN — OXYCODONE AND ACETAMINOPHEN 1 TABLET(S): 5; 325 TABLET ORAL at 01:56

## 2021-06-26 RX ADMIN — MORPHINE SULFATE 4 MILLIGRAM(S): 50 CAPSULE, EXTENDED RELEASE ORAL at 02:03

## 2021-06-26 RX ADMIN — OXYCODONE AND ACETAMINOPHEN 1 TABLET(S): 5; 325 TABLET ORAL at 01:10

## 2021-06-26 NOTE — ED PROVIDER NOTE - CLINICAL SUMMARY MEDICAL DECISION MAKING FREE TEXT BOX
Patient presenting with abdominal pain. recent surgery. no peritoneal on exam. patient passing flatus. will treat pain. surgery consult and reassess

## 2021-06-26 NOTE — ED ADULT NURSE NOTE - NSIMPLEMENTINTERV_GEN_ALL_ED
Implemented All Universal Safety Interventions:  [place] to call system. Call bell, personal items and telephone within reach. Instruct patient to call for assistance. Room bathroom lighting operational. Non-slip footwear when patient is off stretcher. Physically safe environment: no spills, clutter or unnecessary equipment. Stretcher in lowest position, wheels locked, appropriate side rails in place.

## 2021-06-26 NOTE — CONSULT NOTE ADULT - SUBJECTIVE AND OBJECTIVE BOX
GENERAL SURGERY CONSULT NOTE    chief complaint of abd pain    HPI:  56 y/o Male w/ PMHx of Anemia, Lupus, lung fibrosis, Asthma, HLD, COVID, s/p lap cholecystectomy on 6/24/21 with IOC showing a filling defect on CBD. On the following day, pt underwent ERCP sphincterotomy and stone extraction , well tolerated, remained hemodynamically stable and discharge home few hrs ago present to the ED with epigastric pain, described as sharp but denies nausea, vomiting, fever, chills or any other complaints .     PAST MEDICAL & SURGICAL HISTORY:  Asthma    Hepatitis  likely hep A    Lupus    Lung fibrosis    Asthma    HLD (hyperlipidemia)    No significant past surgical history        Review of Systems:    I have reviewed 9 systems with the patient and the only positive findings were     MEDICATIONS  (STANDING):    MEDICATIONS  (PRN):      Allergies    aspirin (Short breath)  penicillin (Short breath)    Intolerances        Social History:      FAMILY HISTORY:      Vital Signs Last 24 Hrs  T(C): 36.8 (26 Jun 2021 00:40), Max: 36.8 (26 Jun 2021 00:40)  T(F): 98.2 (26 Jun 2021 00:40), Max: 98.2 (26 Jun 2021 00:40)  HR: 69 (26 Jun 2021 00:40) (63 - 73)  BP: 146/76 (26 Jun 2021 00:40) (126/68 - 146/76)  BP(mean): 81 (25 Jun 2021 14:48) (81 - 81)  RR: 18 (26 Jun 2021 00:40) (16 - 18)  SpO2: 96% (26 Jun 2021 00:40) (96% - 98%)    Physical Exam:  Vital Signs Last 24 Hrs  T(C): 36.8 (26 Jun 2021 00:40), Max: 36.8 (26 Jun 2021 00:40)  T(F): 98.2 (26 Jun 2021 00:40), Max: 98.2 (26 Jun 2021 00:40)  HR: 69 (26 Jun 2021 00:40) (63 - 73)  BP: 146/76 (26 Jun 2021 00:40) (126/68 - 146/76)  BP(mean): 81 (25 Jun 2021 14:48) (81 - 81)  RR: 18 (26 Jun 2021 00:40) (16 - 18)  SpO2: 96% (26 Jun 2021 00:40) (96% - 98%)    General:  A&Ox3,Appears stated age, No acute distress,  Head: NC/AT  EENT: PERRLA. EOMI. Conjunctiva and sclera clear. Pharynx clear.  Neck: Supple. No JVD  Lungs: CTA B/l. Nonlabored Respirations  CV: +S1S2, RRR  Abdomen: Soft, Nondistended,  mild incisional tenderness, no guarding, no rebound  Extremities: Warm and well perfused. 2+ peripheral pulses b/l. Calf soft, nontender b/l. No pedal edema.            LABS:                        12.1   10.95 )-----------( 203      ( 26 Jun 2021 01:07 )             34.8     06-26    140  |  106  |  17  ----------------------------<  149<H>  4.0   |  28  |  x     Ca    8.5      26 Jun 2021 01:07    TPro  x   /  Alb  3.5  /  TBili  x   /  DBili  x   /  AST  x   /  ALT  x   /  AlkPhos  x   06-26    LIVER FUNCTIONS - ( 26 Jun 2021 01:07 )  Alb: 3.5 g/dL / Pro: x     / ALK PHOS: x     / ALT: x     / AST: x     / GGT: x           PT/INR - ( 24 Jun 2021 15:18 )   PT: 14.2 sec;   INR: 1.20 ratio         PTT - ( 24 Jun 2021 15:18 )  PTT:29.3 sec      RADIOLOGY & ADDITIONAL STUDIES:  No imaging done

## 2021-06-26 NOTE — ED PROVIDER NOTE - OBJECTIVE STATEMENT
56 y/o male with history of hepatitis, HLD, presenting with abdominal pain. Patient endorses that he had recent cholecystectomy. Patient felt well, and passing gas. Before coming to ER, patient moved out of bed and felt pain but pain is improved. Patient denies nausea, vomiting, chest pain, or shortness of breath.

## 2021-06-26 NOTE — ED PROVIDER NOTE - PATIENT PORTAL LINK FT
You can access the FollowMyHealth Patient Portal offered by [facility] by registering at the following website: [email or web address]. By joining [facility]’s FollowMyHealth portal, you will also be able to view your health information using other applications (apps) compatible with our system.

## 2021-06-26 NOTE — ED ADULT TRIAGE NOTE - CHIEF COMPLAINT QUOTE
pt stated s/p cholecystectomy  on Thursday  he was  yesterday d/c at 6;30pm from this hospital compliant of stomach pain denies nausea, vomiting or diarrhea

## 2021-06-26 NOTE — ED PROVIDER NOTE - PROGRESS NOTE DETAILS
patient seen by surgery, lft and lipase wnl compared to discharge lab. no peritoneal on exam. pain resolved. per surgery, can f.u outpatient. given med, return precaution and instructed to f.u dr barry

## 2021-06-28 LAB — ALLERGY+IMMUNOLOGY DIAG STUDY NOTE: SIGNIFICANT CHANGE UP

## 2021-06-28 PROCEDURE — 84484 ASSAY OF TROPONIN QUANT: CPT

## 2021-06-28 PROCEDURE — 76000 FLUOROSCOPY <1 HR PHYS/QHP: CPT

## 2021-06-28 PROCEDURE — 76705 ECHO EXAM OF ABDOMEN: CPT

## 2021-06-28 PROCEDURE — C1769: CPT

## 2021-06-28 PROCEDURE — 86901 BLOOD TYPING SEROLOGIC RH(D): CPT

## 2021-06-28 PROCEDURE — 36415 COLL VENOUS BLD VENIPUNCTURE: CPT

## 2021-06-28 PROCEDURE — 87635 SARS-COV-2 COVID-19 AMP PRB: CPT

## 2021-06-28 PROCEDURE — 99285 EMERGENCY DEPT VISIT HI MDM: CPT | Mod: 25

## 2021-06-28 PROCEDURE — 96376 TX/PRO/DX INJ SAME DRUG ADON: CPT

## 2021-06-28 PROCEDURE — 88304 TISSUE EXAM BY PATHOLOGIST: CPT

## 2021-06-28 PROCEDURE — C9399: CPT

## 2021-06-28 PROCEDURE — 86870 RBC ANTIBODY IDENTIFICATION: CPT

## 2021-06-28 PROCEDURE — 83605 ASSAY OF LACTIC ACID: CPT

## 2021-06-28 PROCEDURE — 86900 BLOOD TYPING SEROLOGIC ABO: CPT

## 2021-06-28 PROCEDURE — 83690 ASSAY OF LIPASE: CPT

## 2021-06-28 PROCEDURE — 85610 PROTHROMBIN TIME: CPT

## 2021-06-28 PROCEDURE — 85025 COMPLETE CBC W/AUTO DIFF WBC: CPT

## 2021-06-28 PROCEDURE — 96374 THER/PROPH/DIAG INJ IV PUSH: CPT

## 2021-06-28 PROCEDURE — 86860 RBC ANTIBODY ELUTION: CPT

## 2021-06-28 PROCEDURE — 86769 SARS-COV-2 COVID-19 ANTIBODY: CPT

## 2021-06-28 PROCEDURE — 71046 X-RAY EXAM CHEST 2 VIEWS: CPT

## 2021-06-28 PROCEDURE — 96375 TX/PRO/DX INJ NEW DRUG ADDON: CPT

## 2021-06-28 PROCEDURE — 86803 HEPATITIS C AB TEST: CPT

## 2021-06-28 PROCEDURE — C1889: CPT

## 2021-06-28 PROCEDURE — 80053 COMPREHEN METABOLIC PANEL: CPT

## 2021-06-28 PROCEDURE — 94640 AIRWAY INHALATION TREATMENT: CPT

## 2021-06-28 PROCEDURE — 86880 COOMBS TEST DIRECT: CPT

## 2021-06-28 PROCEDURE — 83880 ASSAY OF NATRIURETIC PEPTIDE: CPT

## 2021-06-28 PROCEDURE — 85027 COMPLETE CBC AUTOMATED: CPT

## 2021-06-28 PROCEDURE — 93005 ELECTROCARDIOGRAM TRACING: CPT

## 2021-06-28 PROCEDURE — 85730 THROMBOPLASTIN TIME PARTIAL: CPT

## 2021-06-28 PROCEDURE — 86850 RBC ANTIBODY SCREEN: CPT

## 2021-06-29 LAB — SURGICAL PATHOLOGY STUDY: SIGNIFICANT CHANGE UP

## 2023-04-10 NOTE — PATIENT PROFILE ADULT - BRAND OF COVID-19 VACCINATION
Essential hypertension   Norvasc 10mg/day  Restart Altace at lower dose 2.5mg/day (PTA dose 10mg/day) - increase in SNF as hemodynamics allow
Pfizer dose 1

## 2023-07-19 NOTE — ED PROVIDER NOTE - PSYCHIATRIC, MLM
70
Alert and oriented to person, place, time/situation. normal mood and affect. no apparent risk to self or others.

## 2024-11-06 NOTE — ED ADULT TRIAGE NOTE - BSA (M2)
Retail Pharmacy Prior Authorization Team   Phone: [phone]      PRIOR AUTHORIZATION DENIED    Medication: ZEPBOUND 10 MG/0.5ML SC SOAJ  Insurance Company: [employer] - Phone [phone] Fax [phone]  Denial Date: 11/6/2024  Denial Reason(s):           Appeal Information: Drug exclusions cannot be appealed.  This medication will not be covered by the prescription plan for any reason. The drug is not on formulary and there are no loopholes to gaining approval.    Patient Notified: No. The [employer] PA Team does not notify of the denial outcomes as the patient often will ask what their provider will prescribe in place of the denied medication, or additional information in regards to other therapies they can take in place of the denied medication.  This is not something we can advise on in our department.         2